# Patient Record
Sex: FEMALE | Race: WHITE | Employment: OTHER | URBAN - METROPOLITAN AREA
[De-identification: names, ages, dates, MRNs, and addresses within clinical notes are randomized per-mention and may not be internally consistent; named-entity substitution may affect disease eponyms.]

---

## 2017-05-01 ENCOUNTER — HOSPITAL ENCOUNTER (OUTPATIENT)
Dept: PHYSICAL THERAPY | Age: 82
Discharge: HOME | End: 2017-05-01
Payer: MEDICARE

## 2017-05-01 PROCEDURE — 97162 PT EVAL MOD COMPLEX 30 MIN: CPT

## 2017-05-01 PROCEDURE — G8979 MOBILITY GOAL STATUS: HCPCS

## 2017-05-01 PROCEDURE — 97530 THERAPEUTIC ACTIVITIES: CPT

## 2017-05-01 PROCEDURE — G8978 MOBILITY CURRENT STATUS: HCPCS

## 2017-05-01 NOTE — PROGRESS NOTES
PT DAILY TREATMENT NOTE - [facility]     Patient Name: Ludin Stanton  Date:2017  : 1928  [x]  Patient  Verified  Payor: Fawad Manriquez / Plan: [place] / Product Type: Medicare /    In time:12:25 pm  Out time:1:20 pm  Total Treatment Time (min): 55  Total Timed Codes (min): 20  1:1 Treatment Time ( only): 54   Visit #:  16    Treatment Area: Muscle weakness [M62.81]  Balance problem [R26.89]    SUBJECTIVE  Pain Level (0-10 scale): 0  Any medication changes, allergies to medications, adverse drug reactions, diagnosis change, or new procedure performed?: [x] No    [] Yes (see summary sheet for update)  Subjective functional status/changes:   [] No changes reported    Hx Present Illness: \"Well they think I had another stroke. Around the . I had the same symptoms as the last time. \"  Stated \"I noticed that this whole right side was week\"  Denise Badder 17 in bathroom- turned and hit walker and fell  Denise Badder 17 in bathroom at sink, turned and \"I just fell\"  Stated that is dizzy \"A little bit all the time, just sitting here then when I stand up I get real dizzy. \"  Increase in dizziness with going from sitting<>standing, bending over   Had episode of vertigo in mid April took Meclizine - rolling over in bed to the right.    Dizziness with ascending<>descending  Stirs, standing  Walks with 4 wheeled RW  Reports numbness and tingling into bilateral LE  Difficulty with going from sitting<>standing, walking, standing, toileting, shopping, bending over   Increased fatigue with daily activities - riding in car, shopping  Has pending CT scan to rule out/in CVA  Pt's daughter reports increased weakness and imbalance  Noted right hand and UE weakness       Pain:  _5-5__/10 max       __0_/10 min     _0___/10 now    Location: bilateral knees in anterior knees     [] Sharp    [] Dull      [] Burning     [x]  Aching     [] Throbbing      [] Tingling     [] Other:       []  Constant [x] Intermittent        Previous treatment:   PT following initial CVA    PMHX: Significant for: Previous CVA, please  See past medical hx form     Social/Recreation/Work: pt is retired  Lives with daughters - splits time between 2 different daughters homes  Stairs - railing on both sides  Mod I for ADLs - bathing    Patient Goal(s): \"To get my leg un-numbed and get some feeling back in my leg. \"    OBJECTIVE    Modality rationale:    Min Type Additional Details    [] Estim:  []Unatt       []IFC  []Premod                        []Other:  []w/ice   []w/heat  Position:  Location:    [] Estim: []Att    []TENS instruct  []NMES                    []Other:  []w/US   []w/ice   []w/heat  Position:  Location:    []  Traction: [] Cervical       []Lumbar                       [] Prone          []Supine                       []Intermittent   []Continuous Lbs:  [] before manual  [] after manual    []  Ultrasound: []Continuous   [] Pulsed                           []1MHz   []3MHz W/cm2:  Location:    []  Iontophoresis with dexamethasone         Location: [] Take home patch   [] In clinic    []  Ice     []  heat  []  Ice massage  []  Laser   []  Anodyne Position:  Location:    []  Laser with stim  []  Other:  Position:  Location:    []  Vasopneumatic Device Pressure:       [] lo [] med [] hi   Temperature: [] lo [] med [] hi   [] Skin assessment post-treatment:  []intact []redness- no adverse reaction    []redness  adverse reaction:     35 min [x]Eval                  []Re-Eval     20 min Therapeutic Activity:  []  See flow sheet : assessed with FOTO     With   [] TE   [x] TA   [] neuro   [] other: Patient Education: [] Review HEP    [] Progressed/Changed HEP based on:   [] positioning   [] body mechanics   [] transfers   [] heat/ice application    [x] other: reviewed exam finding, vestibular sx function, transfers, safety with transfers, anatomy involved with exam findings and POC     Other Objective/Functional Measures: Movement/gait:  Walks with 4 wheeled RW    Visual Inspection: Thoracic: increased  Lumbar: flattened    Palpation:                            AROM/PROM Right Left                                   Strength Right Left   Hip Flex 3 4-   Knee Ext 4- 5   Hamstrings 4 4   DF 3 4   PF 3- (gross) 3   IV 3 4   EV 3 4            Special Tests Right Left   Coordination  - heel to [facility]   Proprioception  Great toe  Ankle   Absent  WFL    WFL   VOR slow WFL WFL   VOR Fast + +   Head THrust +, nystagmus + - no nystagmus   Occular ROM  Corrective saccades Corrective saccades    Significant increase in sx with VOR assessment     Other EO EC   Wide POLLY NT NT   Regular POLLY NT NT   Romberg  NT NT                  Unable to assess balance due to increased dizziness       Other Comments: Standing Forward Flexion unable to, increases dizziness  With gross assessment decreased LT sensation on right LE compared to left          Pain Level (0-10 scale) post treatment: 0    ASSESSMENT/Changes in Function:   Pt is an 80 y.o. Female with C/C of dizziness, imbalance and right sided weakness. Pt reports possible CVA affecting right side, could have occurred at or around April 1 this year. Has pending CT scan tomorrow. Pt has hx of CVA with right sided hemiparesis. Pt reports 2 falls in last month both occurring in restroom after standing up from toileting and turning. Pt reports almost constant level of dizziness at times increases to debilitating level and has contributed to both falls. Increase in dizziness with going from sitting<>standing, bending over and intermittently rolling to right in bed. Other objective Findings include, corrective saccades with occular ROM and smooth pursuit, positive VOR bilaterally and positive head thrust to the right, right LE weakness, decreased anterior weight shift with sit<>stand. Pt lives with daughters - splits time between 2 daughters homes. Has stairs to negotiation and requires assistance. Could benefit from skilled PT to address dizziness, fall risk, functional mobility and safety with ADLs. Patient will continue to benefit from skilled PT services to modify and progress therapeutic interventions, address functional mobility deficits, address ROM deficits, address strength deficits, analyze and address soft tissue restrictions, analyze and cue movement patterns, analyze and modify body mechanics/ergonomics, assess and modify postural abnormalities, address imbalance/dizziness and instruct in home and community integration to attain remaining goals. [x]  See Plan of Care  []  See progress note/recertification  []  See Discharge Summary         Progress towards goals / Updated goals:  Short Term Goals: STG- To be accomplished in 3 week(s):  1. Pt will be independent with HEP to encourage prophylaxis. Eval: held due to time and dizziness  Current: NA    2. Pt will demonstrate safe sit<>stand transfers, Mod I with adherence to all safety precautions. Eval: dizziness, use of hand on RW and increased dizziness. Current: NA    Long Term Goals: LTG- To be accomplished in 8 week(s):  1. Pt will improve Romberg to 1-2 errors with EO and EC to increase tolerance to daily activities. Eval:unable to balance with regular POLLY  Current: NA    2. Improve DIAMOND by >8 points to indicate decreased fall risk. Eval: evidenced of elevated fall risk  Current: NA    3. Improve right LE MMT to 3/5 MMT or greater to increase pt tolerance to daily activities and WB activities. Eval:  Strength Right Left   Hip Flex 3 4-   Knee Ext 4- 5   Hamstrings 4 4   DF 3 4   PF 3- (gross) 3   IV 3 4   EV 3 4              Current: NA    4. Pt FOTO score will increase by 10 points to show improvement in function. Eval:30  Current: will address at visit 5    5. Pt will be able to go from sitting<>standing without debilitating dizziness.   Eval: constant dizziness  Current: NA      PLAN  [x]  Upgrade activities as tolerated []  Continue plan of care  []  Update interventions per flow sheet       []  Discharge due to:_  []  Other:_      Birtha Pasha PT, DPT 5/1/2017  12:27 PM    No future appointments.

## 2017-05-01 NOTE — PROGRESS NOTES
[facility] at the [address] [place], [address] [facility]  Phone: [phone]      Fax:  [phone]    Plan of Care/ Statement of Necessity for Physical Therapy Services    Patient name: Nathaly Starks Start of Care: 2017   Referral source: Miguel Angel Rodríguez MD : 1928    Medical Diagnosis: Muscle weakness [M62.81]  Balance problem [R26.89]   Onset Date:~17    Treatment Diagnosis: Right sided weakness, dizziness, imbalance    Prior Hospitalization: see medical history Provider#: [id number]   Medications: Verified on Patient summary List    Comorbidities: Allergies, BMI >30, HTN, Previous CVA   Prior Level of Function: constant dizziness, hx of 2 falls in last month, limited community Ambulator with rolling walker, CGA-SBA with stairs and 2 railings, CGA - Mod I with ADLs. The Plan of Care and following information is based on the information from the initial evaluation. Assessment/ key information:   Pt is an 80 y.o. Female with C/C of dizziness, imbalance and right sided weakness. Pt reports possible CVA affecting right side, could have occurred at or around  this year. Has pending CT scan tomorrow. Pt has hx of CVA with right sided hemiparesis. Pt reports 2 falls in last month both occurring in restroom after standing up from toileting and turning. Pt reports almost constant level of dizziness at times increases to debilitating level and has contributed to both falls. Increase in dizziness with going from sitting<>standing, bending over and intermittently rolling to right in bed. Other objective Findings include, corrective saccades with occular ROM and smooth pursuit, positive VOR bilaterally and positive head thrust to the right, right LE weakness, decreased anterior weight shift with sit<>stand. Pt lives with daughters - splits time between 2 daughters homes. Has stairs to negotiation and requires assistance.  Could benefit from skilled PT to address dizziness, fall risk, functional mobility and safety with ADLs. Evaluation Complexity History HIGH Complexity :3+ comorbidities / personal factors will impact the outcome/ POC ; Examination HIGH Complexity : 4+ Standardized tests and measures addressing body structure, function, activity limitation and / or participation in recreation  ;Presentation HIGH Complexity : Unstable and unpredictable characteristics  ; Clinical Decision Making MEDIUM Complexity : FOTO score of 26-74  Overall Complexity Rating: MEDIUM    Problem List: pain affecting function, decrease ROM, decrease strength, edema affecting function, impaired gait/ balance, decrease ADL/ functional abilitiies, decrease activity tolerance, decrease flexibility/ joint mobility and decrease transfer abilities   Treatment Plan may include any combination of the following: Therapeutic exercise, Therapeutic activities, Neuromuscular re-education, Physical agent/modality, Gait/balance training, Manual therapy, Aquatic therapy, Patient education and Home safety training  Patient / Family readiness to learn indicated by: asking questions, trying to perform skills and interest  Persons(s) to be included in education: patient (P) and family support person (FSP);list pt's daughter   Barriers to Learning/Limitations: None  Patient Goal (s): To get my leg un-numbed and get some feeling back in my leg  Patient Self Reported Health Status: fair  Rehabilitation Potential: good    Short Term Goals: STG- To be accomplished in 3 week(s):  1. Pt will be independent with HEP to encourage prophylaxis. Eval: held due to time and dizziness  Current: NA     2. Pt will demonstrate safe sit<>stand transfers, Mod I with adherence to all safety precautions. Eval: dizziness, use of hand on RW and increased dizziness. Current: NA     Long Term Goals: LTG- To be accomplished in 8 week(s):  1.  Pt will improve Romberg to 1-2 errors with EO and EC to increase tolerance to daily activities. Eval:unable to balance with regular POLLY  Current: NA     2. Improve DIAMOND by >8 points to indicate decreased fall risk. Eval: evidenced of elevated fall risk  Current: NA     3. Improve right LE MMT to 3/5 MMT or greater to increase pt tolerance to daily activities and WB activities. Eval:  Strength Right Left   Hip Flex 3 4-   Knee Ext 4- 5   Hamstrings 4 4   DF 3 4   PF 3- (gross) 3   IV 3 4   EV 3 4                 Current: NA     4. Pt FOTO score will increase by 10 points to show improvement in function. Eval:30  Current: will address at visit 5     5. Pt will be able to go from sitting<>standing without debilitating dizziness. Eval: constant dizziness  Current: NA  Frequency / Duration: Patient to be seen 2 times per week for 8 weeks. Patient/ Caregiver education and instruction: Diagnosis, prognosis, self care, activity modification and other safety with sit<>stand transfers   [x]  Plan of care has been reviewed with PTA    G-Codes (GP)  Mobility   Current  CL= 60-79%   Goal  CK= 40-59%      The severity rating is based on clinical judgment and the FOTO score. Certification Period: 5/1/17 - 7/1/17   Bianka Rodriguez PT, DPT 5/1/2017 2:35 PM  _____________________________________________________________________  I certify that the above Therapy Services are being furnished while the patient is under my care. I agree with the treatment plan and certify that this therapy is necessary.     Physician's Signature:____________________  Date:__________Time:______    Please sign and return to   [facility] at the [address], [facility], [address] [facility]  Phone: [phone]      Fax:  [phone]

## 2017-05-03 ENCOUNTER — HOSPITAL ENCOUNTER (OUTPATIENT)
Dept: PHYSICAL THERAPY | Age: 82
Discharge: HOME | End: 2017-05-03
Payer: MEDICARE

## 2017-05-03 PROCEDURE — 97140 MANUAL THERAPY 1/> REGIONS: CPT

## 2017-05-03 PROCEDURE — 97530 THERAPEUTIC ACTIVITIES: CPT

## 2017-05-03 NOTE — PROGRESS NOTES
PT DAILY TREATMENT NOTE - [facility]     Patient Name: Esther Garcia  Date:5/3/2017  : 1928  [x]  Patient  Verified  Payor: Adi Masoud / Plan: [place] MEDICARE PART A & B / Product Type: Medicare /    In time:12:00 pm  Out time:12:50 pm  Total Treatment Time (min): 50  Total Timed Codes (min): 50  1:1 Treatment Time ( [address] only): 50   Visit #: 2 of 16    Treatment Area: Muscle weakness [M62.81]  Balance problem [R26.89]    SUBJECTIVE  Pain Level (0-10 scale): 0  Any medication changes, allergies to medications, adverse drug reactions, diagnosis change, or new procedure performed?: [x] No    [] Yes (see summary sheet for update)  Subjective functional status/changes:   [] No changes reported  \"I am dizzy, I am dizzy all the time, I have been dizzy since I left last time. \"    OBJECTIVE    Modality rationale:    Min Type Additional Details    [] Estim:  []Unatt       []IFC  []Premod                        []Other:  []w/ice   []w/heat  Position:  Location:    [] Estim: []Att    []TENS instruct  []NMES                    []Other:  []w/US   []w/ice   []w/heat  Position:  Location:    []  Traction: [] Cervical       []Lumbar                       [] Prone          []Supine                       []Intermittent   []Continuous Lbs:  [] before manual  [] after manual    []  Ultrasound: []Continuous   [] Pulsed                           []1MHz   []3MHz W/cm2:  Location:    []  Iontophoresis with dexamethasone         Location: [] Take home patch   [] In clinic    []  Ice     []  heat  []  Ice massage  []  Laser   []  Anodyne Position:  Location:    []  Laser with stim  []  Other:  Position:  Location:    []  Vasopneumatic Device Pressure:       [] lo [] med [] hi   Temperature: [] lo [] med [] hi   [] Skin assessment post-treatment:  []intact []redness- no adverse reaction    []redness  adverse reaction:     40 min Therapeutic Activity:  []  See flow sheet :assessment for BPPV, pt education, assistance leaving clinic and getting gin car   Rationale: improve coordination, improve balance and increase proprioception  to improve the patients ability to perform daily activities with decreased pain and symptom levels     10 min Manual Therapy:  Correction for BPPV with CRM   Rationale: correct positional vertigo to improve safety and quality of life          With   [] TE   [x] TA   [] neuro   [] other: Patient Education: [] Review HEP    [] Progressed/Changed HEP based on:   [] positioning   [] body mechanics   [] transfers   [] heat/ice application    [x] other: reviewed precautions - no lying down until bedtime. Other Objective/Functional Measures:   + Dellis Cower to the Right     Pain Level (0-10 scale) post treatment: 0    ASSESSMENT/Changes in Function:   Only able to test and correct 1 time for positional vertigo due to increased fatigue. Pt was very fatigued after treatment session. Reported that dizziness was \"slightly better\" at end of session. Required CGA-SBA with exiting clinic. Suspect underlying vestibular dysfunction. Patient will continue to benefit from skilled PT services to modify and progress therapeutic interventions, address functional mobility deficits, address ROM deficits, address strength deficits, analyze and address soft tissue restrictions, analyze and cue movement patterns, analyze and modify body mechanics/ergonomics, assess and modify postural abnormalities, address imbalance/dizziness and instruct in home and community integration to attain remaining goals. []  See Plan of Care  []  See progress note/recertification  []  See Discharge Summary         Progress towards goals / Updated goals:  Short Term Goals: STG- To be accomplished in 3 week(s):  1. Pt will be independent with HEP to encourage prophylaxis. Eval: held due to time and dizziness  Current: NA due to assessing and treating BPPV      2. Pt will demonstrate safe sit<>stand transfers, Mod I with adherence to all safety precautions. Eval: dizziness, use of hand on RW and increased dizziness. Current: NA      Long Term Goals: LTG- To be accomplished in 8 week(s):  1. Pt will improve Romberg to 1-2 errors with EO and EC to increase tolerance to daily activities. Eval:unable to balance with regular POLLY  Current: NA      2. Improve DIAMOND by >8 points to indicate decreased fall risk. Eval: evidenced of elevated fall risk  Current: NA      3. Improve right LE MMT to 3/5 MMT or greater to increase pt tolerance to daily activities and WB activities. Eval:  Strength Right Left   Hip Flex 3 4-   Knee Ext 4- 5   Hamstrings 4 4   DF 3 4   PF 3- (gross) 3   IV 3 4   EV 3 4                  Current: NA      4. Pt FOTO score will increase by 10 points to show improvement in function. Eval:30  Current: will address at visit 5      5. Pt will be able to go from sitting<>standing without debilitating dizziness.   Eval: constant dizziness  Current: NA      PLAN  []  Upgrade activities as tolerated     []  Continue plan of care  []  Update interventions per flow sheet       []  Discharge due to:_  []  Other:_      Annia Steele, PT, DPT 5/3/2017  1:19 PM    Future Appointments  Date Time Provider [place]   5/8/2017 9:30 AM Robles Ear [facility]   5/10/2017 9:30 AM Annia Steele PT, DPT [facility]   5/15/2017 9:30 AM Rob[facility] Ear [facility]   5/17/2017 9:30 AM Annia Steele PT, DPT [facility]   5/22/2017 11:00 AM Robles Ear [facility]   5/24/2017 11:00 AM Robles Ear [facility]

## 2017-05-08 ENCOUNTER — HOSPITAL ENCOUNTER (OUTPATIENT)
Dept: PHYSICAL THERAPY | Age: 82
Discharge: HOME | End: 2017-05-08
Payer: MEDICARE

## 2017-05-08 PROCEDURE — 97140 MANUAL THERAPY 1/> REGIONS: CPT

## 2017-05-08 PROCEDURE — 97530 THERAPEUTIC ACTIVITIES: CPT

## 2017-05-08 NOTE — PROGRESS NOTES
PT DAILY TREATMENT NOTE     Patient Name: Tatyana Gautam  Date:2017  : 1928  [x]  Patient  Verified  Payor: Anusha Rg / Plan: [place] MED[place] A & B / Product Type: Medicare /    In time:9:36 am  Out time: 10:40 am  Total Treatment Time (min): 64  Total Timed Codes (min): 64  1:1 Treatment Time ( [address] only): 50   Visit #: 3 of 16    Treatment Area: Muscle weakness [M62.81]  Balance problem [R26.89]    SUBJECTIVE  Pain Level (0-10 scale): 0  Any medication changes, allergies to medications, adverse drug reactions, diagnosis change, or new procedure performed?: [x] No    [] Yes (see summary sheet for update)  Subjective functional status/changes:   [] No changes reported  \"A smidge better. Just dizzy all the time. When i sit up, stand up roll over. \"    OBJECTIVE    Modality rationale:    Min Type Additional Details    [] Estim:  []Unatt       []IFC  []Premod                        []Other:  []w/ice   []w/heat  Position:  Location:    [] Estim: []Att    []TENS instruct  []NMES                    []Other:  []w/US   []w/ice   []w/heat  Position:  Location:    []  Traction: [] Cervical       []Lumbar                       [] Prone          []Supine                       []Intermittent   []Continuous Lbs:  [] before manual  [] after manual    []  Ultrasound: []Continuous   [] Pulsed                           []1MHz   []3MHz W/cm2:  Location:    []  Iontophoresis with dexamethasone         Location: [] Take home patch   [] In clinic    []  Ice     []  heat  []  Ice massage  []  Laser   []  Anodyne Position:  Location:    []  Laser with stim  []  Other:  Position:  Location:    []  Vasopneumatic Device Pressure:       [] lo [] med [] hi   Temperature: [] lo [] med [] hi   [] Skin assessment post-treatment:  []intact []redness- no adverse reaction    []redness  adverse reaction:     40 min Therapeutic Activity: [] See flow sheet :assessment for BPPV, pt education, assistance leaving clinic and getting gin car Rationale: improve coordination, improve balance and increase proprioception to improve the patients ability to perform daily activities with decreased pain and symptom levels    24 min Manual Therapy: Correction for BPPV with CRM x2   Rationale: correct positional vertigo to improve safety and quality of life          With   [] TE   [x] TA   [] neuro   [] other: Patient Education: [x] Review HEP - added VOR x1 with pt and pt's daughter     [] Progressed/Changed HEP based on:   [] positioning   [] body mechanics   [] transfers   [] heat/ice application    [] other:      Other Objective/Functional Measures:   + [place] to the right  Sx dissipated 3 min quicker with second assessment and CRM     Pain Level (0-10 scale) post treatment: 0    ASSESSMENT/Changes in Function:   Pt was very fatigued after treatment session. Reported that dizziness was \"slightly better\" at end of session. Used WC to transport pt to car, CGA-SBA for car transfer. Suspect underlying vestibular dysfunction. Patient will continue to benefit from skilled PT services to modify and progress therapeutic interventions, address functional mobility deficits, address ROM deficits, address strength deficits, analyze and address soft tissue restrictions, analyze and cue movement patterns, analyze and modify body mechanics/ergonomics, assess and modify postural abnormalities, address imbalance/dizziness and instruct in home and community integration to attain remaining goals. []  See Plan of Care  []  See progress note/recertification  []  See Discharge Summary         Progress towards goals / Updated goals:  Short Term Goals: STG- To be accomplished in 3 week(s):  1. Pt will be independent with HEP to encourage prophylaxis. Eval: held due to time and dizziness  Current: NA due to assessing and treating BPPV      2. Pt will demonstrate safe sit<>stand transfers, Mod I with adherence to all safety precautions.    Eval: dizziness, use of hand on RW and increased dizziness. Current: NA      Long Term Goals: LTG- To be accomplished in 8 week(s):  1. Pt will improve Romberg to 1-2 errors with EO and EC to increase tolerance to daily activities. Eval:unable to balance with regular POLLY  Current: NA      2. Improve DIAMOND by >8 points to indicate decreased fall risk. Eval: evidenced of elevated fall risk  Current: NA      3. Improve right LE MMT to 3/5 MMT or greater to increase pt tolerance to daily activities and WB activities. Eval:  Strength Right Left   Hip Flex 3 4-   Knee Ext 4- 5   Hamstrings 4 4   DF 3 4   PF 3- (gross) 3   IV 3 4   EV 3 4                  Current: NA      4. Pt FOTO score will increase by 10 points to show improvement in function. Eval:30  Current: will address at visit 5      5. Pt will be able to go from sitting<>standing without debilitating dizziness.   Eval: constant dizziness  Current: NA         PLAN  []  Upgrade activities as tolerated     []  Continue plan of care  []  Update interventions per flow sheet       []  Discharge due to:_  []  Other:_      Juma Aguirre PT, DPT 5/8/2017  10:02 AM    Future Appointments  Date Time Provider Allie Schmitt   5/10/2017 9:30 AM Juma Aguirre PT, DPT [facility]   5/15/2017 9:30 AM Jayda [facility]   5/17/2017 9:30 AM Juma Aguirre PT, DPT [facility]   5/22/2017 11:00 AM Jayda [facility]   5/24/2017 11:00 AM Jayda [facility]

## 2017-05-10 ENCOUNTER — HOSPITAL ENCOUNTER (OUTPATIENT)
Dept: PHYSICAL THERAPY | Age: 82
Discharge: HOME | End: 2017-05-10
Payer: MEDICARE

## 2017-05-10 PROCEDURE — 97530 THERAPEUTIC ACTIVITIES: CPT

## 2017-05-10 PROCEDURE — 97112 NEUROMUSCULAR REEDUCATION: CPT

## 2017-05-10 NOTE — PROGRESS NOTES
PT DAILY TREATMENT NOTE - [facility]     Patient Name: Maris Cortes  Date:5/10/2017  : 1928  [x]  Patient  Verified  Payor: Damian Ap / Plan: [place] MEDICARE PART A & B / Product Type: Medicare /    In time:9:37 am  Out time:10:20 am  Total Treatment Time (min): 43  Total Timed Codes (min): 43  1:1 Treatment Time ( [address] only): 37   Visit #: 4 of 16    Treatment Area: Muscle weakness [M62.81]  Balance problem [R26.89]    SUBJECTIVE  Pain Level (0-10 scale): 0  Any medication changes, allergies to medications, adverse drug reactions, diagnosis change, or new procedure performed?: [x] No    [] Yes (see summary sheet for update)  Subjective functional status/changes:   [] No changes reported  \"It is gone. I am so much better. Monday was terrible but Tuesday it was almost all the way gone. I am just a little light dizzy with some things. \"    OBJECTIVE    Modality rationale:    Min Type Additional Details    [] Estim:  []Unatt       []IFC  []Premod                        []Other:  []w/ice   []w/heat  Position:  Location:    [] Estim: []Att    []TENS instruct  []NMES                    []Other:  []w/US   []w/ice   []w/heat  Position:  Location:    []  Traction: [] Cervical       []Lumbar                       [] Prone          []Supine                       []Intermittent   []Continuous Lbs:  [] before manual  [] after manual    []  Ultrasound: []Continuous   [] Pulsed                           []1MHz   []3MHz W/cm2:  Location:    []  Iontophoresis with dexamethasone         Location: [] Take home patch   [] In clinic    []  Ice     []  heat  []  Ice massage  []  Laser   []  Anodyne Position:  Location:    []  Laser with stim  []  Other:  Position:  Location:    []  Vasopneumatic Device Pressure:       [] lo [] med [] hi   Temperature: [] lo [] med [] hi   [] Skin assessment post-treatment:  []intact []redness- no adverse reaction    []redness  adverse reaction:       43 min Therapeutic Activity:  []  See flow sheet : Rationale: improve coordination, improve balance and increase proprioception  to improve the patients ability to perform daily activities with decreased pain and symptom levels          With   [] TE   [x] TA   [] neuro   [] other: Patient Education: [x] Review HEP    [] Progressed/Changed HEP based on:   [] positioning   [] body mechanics   [] transfers   [] heat/ice application    [] other:      Other Objective/Functional Measures:   DIAMOND 20/56     Pain Level (0-10 scale) post treatment: 0    ASSESSMENT/Changes in Function:   Pt able to tolerate assessment with DIAMOND this session. Reports remarked decrease in dizziness since Monday. Only has dizziness with turning and intermittently with bending over. Able to turn to the left, unable to to turn to right with DIAMOND due to Right LE and dizziness. Reports that dizziness went from constant or 10/10 to 2/10 and only intermittent. Patient will continue to benefit from skilled PT services to modify and progress therapeutic interventions, address functional mobility deficits, address ROM deficits, address strength deficits, analyze and address soft tissue restrictions, analyze and cue movement patterns, analyze and modify body mechanics/ergonomics, assess and modify postural abnormalities, address imbalance/dizziness and instruct in home and community integration to attain remaining goals. []  See Plan of Care  []  See progress note/recertification  []  See Discharge Summary         Progress towards goals / Updated goals:  Short Term Goals: STG- To be accomplished in 3 week(s):  1. Pt will be independent with HEP to encourage prophylaxis. Eval: held due to time and dizziness  Current: dispensed VOR, will update as pt progresses      2. Pt will demonstrate safe sit<>stand transfers, Mod I with adherence to all safety precautions. Eval: dizziness, use of hand on RW and increased dizziness.    Current: requires use of hands from table or chair.      Long Term Goals: LTG- To be accomplished in 8 week(s):  1. Pt will improve Romberg to 1-2 errors with EO and EC to increase tolerance to daily activities. Eval:unable to balance with regular POLLY  Current: NA      2. Improve DIAMOND by >8 points to indicate decreased fall risk. Eval: 20/56 - increased fall risk  Current: reassess in 5-6 visits ss      3. Improve right LE MMT to 3/5 MMT or greater to increase pt tolerance to daily activities and WB activities. Eval:  Strength Right Left   Hip Flex 3 4-   Knee Ext 4- 5   Hamstrings 4 4   DF 3 4   PF 3- (gross) 3   IV 3 4   EV 3 4                  Current: NA      4. Pt FOTO score will increase by 10 points to show improvement in function. Eval:30  Current: will address at visit 5      5. Pt will be able to go from sitting<>standing without debilitating dizziness.   Eval: constant dizziness  Current: NA      PLAN  [x]  Upgrade activities as tolerated     []  Continue plan of care  []  Update interventions per flow sheet       []  Discharge due to:_  []  Other:_      Suzie Berumen PT, DPT 5/10/2017  9:55 AM    Future Appointments  Date Time Provider Allie Schmitt   5/15/2017 9:30 AM Mickey [facility]   5/17/2017 9:30 AM Suzie Berumen PT, DPT [facility]   5/22/2017 11:00 AM Mickey [facility]   5/24/2017 11:00 AM STELLA Lopez [facility]

## 2017-05-15 ENCOUNTER — HOSPITAL ENCOUNTER (OUTPATIENT)
Dept: PHYSICAL THERAPY | Age: 82
Discharge: HOME | End: 2017-05-15
Payer: MEDICARE

## 2017-05-15 PROCEDURE — 97530 THERAPEUTIC ACTIVITIES: CPT

## 2017-05-15 PROCEDURE — 97112 NEUROMUSCULAR REEDUCATION: CPT

## 2017-05-15 PROCEDURE — 97110 THERAPEUTIC EXERCISES: CPT

## 2017-05-15 PROCEDURE — 97140 MANUAL THERAPY 1/> REGIONS: CPT

## 2017-05-15 NOTE — PROGRESS NOTES
PT DAILY TREATMENT NOTE - [facility] 3-16    Patient Name: Filomena Blackwell  Date:5/15/2017  : 1928  [x]  Patient  Verified  Payor: [place] MEDICARE / Plan: [place] MEDICARE PART A & B / Product Type: Medicare /    In time:9:35  Out time:10:22  Total Treatment Time (min): 47  Total Timed Codes (min): 47  1:1 Treatment Time [facility] only):47  Visit #: 5 of 16    Treatment Area: Muscle weakness [M62.81]  Balance problem [R26.89]    SUBJECTIVE  Pain Level (0-10 scale): 0/10  Any medication changes, allergies to medications, adverse drug reactions, diagnosis change, or new procedure performed?: [x] No    [] Yes (see summary sheet for update)  Subjective functional status/changes:   [] No changes reported  \"I don't have any dizziness right now but I had some over the weekend. \"    OBJECTIVE      27 min Therapeutic Exercise:  [x] See flow sheet :   Rationale: increase ROM, increase strength and improve coordination to improve the patients ability to amb safely throughout daily activities. 20 min Neuromuscular Re-education:  [x]  See flow sheet :   Rationale: increase ROM, increase strength and improve coordination  to improve the patients ability to amb safely throughout daily activities. With   [] TE   [] TA   [] neuro   [] other: Patient Education: [x] Review HEP    [] Progressed/Changed HEP based on:   [] positioning   [] body mechanics   [] transfers   [] heat/ice application    [] other:      Other Objective/Functional Measures:      Pain Level (0-10 scale) post treatment: 0/10    ASSESSMENT/Changes in Function: Updated ex program to begin balance training and strength training for LE weakness. Pt tolerated ex well but required CGA for balance correction. Pt denied modalities post tx.      Patient will continue to benefit from skilled PT services to modify and progress therapeutic interventions, address functional mobility deficits, address ROM deficits, address strength deficits, analyze and address soft tissue restrictions and analyze and modify body mechanics/ergonomics to attain remaining goals. []  See Plan of Care  []  See progress note/recertification  []  See Discharge Summary         Progress towards goals / Updated goals:  Short Term Goals: STG- To be accomplished in 3 week(s):  1. Pt will be independent with HEP to encourage prophylaxis. Eval: held due to time and dizziness  Current: dispensed VOR, will update as pt progresses      2. Pt will demonstrate safe sit<>stand transfers, Mod I with adherence to all safety precautions. Eval: dizziness, use of hand on RW and increased dizziness. Current: requires use of hands from table or chair.      Long Term Goals: LTG- To be accomplished in 8 week(s):  1. Pt will improve Romberg to 1-2 errors with EO and EC to increase tolerance to daily activities. Eval:unable to balance with regular POLLY  Current: NA      2. Improve DIAMOND by >8 points to indicate decreased fall risk. Eval: 20/56 - increased fall risk  Current: reassess in 5-6 visits ss      3. Improve right LE MMT to 3/5 MMT or greater to increase pt tolerance to daily activities and WB activities. Eval:  Strength Right Left   Hip Flex 3 4-   Knee Ext 4- 5   Hamstrings 4 4   DF 3 4   PF 3- (gross) 3   IV 3 4   EV 3 4                  Current: NA      4. Pt FOTO score will increase by 10 points to show improvement in function. Eval:30  Current: will address at visit 5      5. Pt will be able to go from sitting<>standing without debilitating dizziness.   Eval: constant dizziness  Current: NA         PLAN  []  Upgrade activities as tolerated     []  Continue plan of care  []  Update interventions per flow sheet       []  Discharge due to:_  []  Other:_      Jayda Liu 5/15/2017  12:49 PM

## 2017-05-17 ENCOUNTER — HOSPITAL ENCOUNTER (OUTPATIENT)
Dept: PHYSICAL THERAPY | Age: 82
Discharge: HOME | End: 2017-05-17
Payer: MEDICARE

## 2017-05-17 PROCEDURE — 97112 NEUROMUSCULAR REEDUCATION: CPT

## 2017-05-17 PROCEDURE — 97530 THERAPEUTIC ACTIVITIES: CPT

## 2017-05-17 NOTE — PROGRESS NOTES
PT DAILY TREATMENT NOTE - [facility]     Patient Name: Irina Rm  Date:2017  : 1928  [x]  Patient  Verified  Payor: Roscoe Ee / Plan: [place] MEDICARE PART A & B / Product Type: Medicare /    In time:9:35 am  Out time:10:20  Total Treatment Time (min): 45  Total Timed Codes (min): 45  1:1 Treatment Time ([address] only): 39   Visit #: 6 of 16    Treatment Area: Muscle weakness [M62.81]  Balance problem [R26.89]    SUBJECTIVE  Pain Level (0-10 scale): 0  Any medication changes, allergies to medications, adverse drug reactions, diagnosis change, or new procedure performed?: [x] No    [] Yes (see summary sheet for update)  Subjective functional status/changes:   [] No changes reported  \"I am still so much better. \"    OBJECTIVE    Modality rationale:    Min Type Additional Details    [] Estim:  []Unatt       []IFC  []Premod                        []Other:  []w/ice   []w/heat  Position:  Location:    [] Estim: []Att    []TENS instruct  []NMES                    []Other:  []w/US   []w/ice   []w/heat  Position:  Location:    []  Traction: [] Cervical       []Lumbar                       [] Prone          []Supine                       []Intermittent   []Continuous Lbs:  [] before manual  [] after manual    []  Ultrasound: []Continuous   [] Pulsed                           []1MHz   []3MHz W/cm2:  Location:    []  Iontophoresis with dexamethasone         Location: [] Take home patch   [] In clinic    []  Ice     []  heat  []  Ice massage  []  Laser   []  Anodyne Position:  Location:    []  Laser with stim  []  Other:  Position:  Location:    []  Vasopneumatic Device Pressure:       [] lo [] med [] hi   Temperature: [] lo [] med [] hi   [] Skin assessment post-treatment:  []intact []redness- no adverse reaction    []redness  adverse reaction:       35 min Therapeutic Activity:  [x]  See flow sheet :   Rationale: increase ROM, increase strength, improve coordination, improve balance and increase proprioception  to improve the patients ability to perform daily activities with decreased pain and symptom levels and decrease fall risk     10 min Neuromuscular Re-education:  [x]  See flow sheet :   Rationale: increase ROM, increase strength, improve coordination, improve balance and increase proprioception  to improve the patients ability to perform daily activities with decreased pain and symptom levels and decrease fall rsik          With   [] TE   [] TA   [] neuro   [] other: Patient Education: [x] Review HEP    [] Progressed/Changed HEP based on:   [] positioning   [] body mechanics   [] transfers   [] heat/ice application    [] other:      Other Objective/Functional Measures:   1-4 errors with balance activities, most challenged with EC    Pain Level (0-10 scale) post treatment: 0    ASSESSMENT/Changes in Function:   Pt reports no dizziness except for with bending over fora prolonged period and returning to upright. Challenged with balance activities and requires SBA or CGA. Hamstring cramping with bridges,decreased cramping with cues to perform glut set. Patient will continue to benefit from skilled PT services to modify and progress therapeutic interventions, address functional mobility deficits, address ROM deficits, address strength deficits, analyze and address soft tissue restrictions, analyze and cue movement patterns, analyze and modify body mechanics/ergonomics, assess and modify postural abnormalities, address imbalance/dizziness and instruct in home and community integration to attain remaining goals. []  See Plan of Care  []  See progress note/recertification  []  See Discharge Summary         Progress towards goals / Updated goals:  Short Term Goals: STG- To be accomplished in 3 week(s):  1. Pt will be independent with HEP to encourage prophylaxis. Eval: held due to time and dizziness  Current: dispensed VOR, will update as pt progresses      2.  Pt will demonstrate safe sit<>stand transfers, Mod I with adherence to all safety precautions. Eval: dizziness, use of hand on RW and increased dizziness. Current: sit<>stand form 19 in surface with no use of UE      Long Term Goals: LTG- To be accomplished in 8 week(s):  1. Pt will improve Romberg to 1-2 errors with EO and EC to increase tolerance to daily activities. Eval:unable to balance with regular POLLY  Current: NA      2. Improve DIAMOND by >8 points to indicate decreased fall risk. Eval: 20/56 - increased fall risk  Current: reassess at visit 10 or 11      3. Improve right LE MMT to 3/5 MMT or greater to increase pt tolerance to daily activities and WB activities. Eval:  Strength Right Left   Hip Flex 3 4-   Knee Ext 4- 5   Hamstrings 4 4   DF 3 4   PF 3- (gross) 3   IV 3 4   EV 3 4                  Current: appropriately challenged with bridges      4. Pt FOTO score will increase by 10 points to show improvement in function. Eval:30  Current: having  connectivity issues, 5/17/17 will reassess next visit      5. Pt will be able to go from sitting<>standing without debilitating dizziness.   Eval: constant dizziness  Current: NA          PLAN  [x]  Upgrade activities as tolerated     [x]  Continue plan of care  []  Update interventions per flow sheet       []  Discharge due to:_  []  Other:_      Michael Naranjo PT, DPT 5/17/2017  10:32 AM    Future Appointments  Date Time Provider Allie Schmitt   5/22/2017 11:00 AM Jackson [facility]   5/24/2017 11:00 AM STELLA BroussardMAGGIE [facility]

## 2017-05-22 ENCOUNTER — HOSPITAL ENCOUNTER (OUTPATIENT)
Dept: PHYSICAL THERAPY | Age: 82
Discharge: HOME | End: 2017-05-22
Payer: MEDICARE

## 2017-05-22 PROCEDURE — 97530 THERAPEUTIC ACTIVITIES: CPT

## 2017-05-22 PROCEDURE — 97112 NEUROMUSCULAR REEDUCATION: CPT

## 2017-05-22 PROCEDURE — 97110 THERAPEUTIC EXERCISES: CPT

## 2017-05-22 NOTE — PROGRESS NOTES
PT DAILY TREATMENT NOTE - [facility] 3-16    Patient Name: Maris Cortes  Date:2017  : 1928  [x]  Patient  Verified  Payor: VA MEDICARE / Plan: VA MEDICARE PART A & B / Product Type: Medicare /    In time:11:05  Out time:11:55  Total Treatment Time (min): 50  Total Timed Codes (min): 50  1:1 Treatment Time ( [address] only): 39   Visit #: 7 of 16    Treatment Area: Muscle weakness [M62.81]  Balance problem [R26.89]    SUBJECTIVE  Pain Level (0-10 scale): 0/10  Any medication changes, allergies to medications, adverse drug reactions, diagnosis change, or new procedure performed?: [x] No    [] Yes (see summary sheet for update)  Subjective functional status/changes:   [] No changes reported  \"I feel great. I have for the almost the past week. \"    OBJECTIVE      25 min Therapeutic Exercise:  [x] See flow sheet :   Rationale: increase ROM, increase strength and improve coordination to improve the patients ability to amb safely throughout daily activities. 15 min Therapeutic Activity:  [x]  See flow sheet :   Rationale: increase ROM, increase strength and improve coordination  to improve the patients ability to amb safely throughout daily activities. 10 min Neuromuscular Re-education:  [x]  See flow sheet :   Rationale: increase ROM, increase strength and improve coordination  to improve the patients ability to amb safely throughout daily activities. With   [] TE   [] TA   [] neuro   [] other: Patient Education: [x] Review HEP    [] Progressed/Changed HEP based on:   [] positioning   [] body mechanics   [] transfers   [] heat/ice application    [] other:      Other Objective/Functional Measures: FOTO:  57    Pain Level (0-10 scale) post treatment: 0/10    ASSESSMENT/Changes in Function: Pt able to progress slightly with balance ex. Pt continues to be mildly challenged by ex showing increased sway and requiring CGA with balance activities. Pt denied modalities post tx.      Patient will continue to benefit from skilled PT services to modify and progress therapeutic interventions, address functional mobility deficits, address ROM deficits, address strength deficits and analyze and modify body mechanics/ergonomics to attain remaining goals. []  See Plan of Care  []  See progress note/recertification  []  See Discharge Summary         Progress towards goals / Updated goals:  Short Term Goals: STG- To be accomplished in 3 week(s):  1. Pt will be independent with HEP to encourage prophylaxis. Eval: held due to time and dizziness  Current: dispensed VOR, will update as pt progresses      2. Pt will demonstrate safe sit<>stand transfers, Mod I with adherence to all safety precautions. Eval: dizziness, use of hand on RW and increased dizziness. Current: sit<>stand form 19 in surface with no use of UE      Long Term Goals: LTG- To be accomplished in 8 week(s):  1. Pt will improve Romberg to 1-2 errors with EO and EC to increase tolerance to daily activities. Eval:unable to balance with regular POLLY  Current: Romberg EO: 0 errors,      2. Improve DIAMOND by >8 points to indicate decreased fall risk. Eval: 20/56 - increased fall risk  Current: reassess at visit 10 or 11      3. Improve right LE MMT to 3/5 MMT or greater to increase pt tolerance to daily activities and WB activities. Eval:  Strength Right Left   Hip Flex 3 4-   Knee Ext 4- 5   Hamstrings 4 4   DF 3 4   PF 3- (gross) 3   IV 3 4   EV 3 4                  Current: appropriately challenged with bridges      4. Pt FOTO score will increase by 10 points to show improvement in function. Eval:30  Current: having  connectivity issues, 5/17/17 will reassess next visit      5. Pt will be able to go from sitting<>standing without debilitating dizziness.   Eval: constant dizziness  Current: NA      PLAN  []  Upgrade activities as tolerated     [x]  Continue plan of care  []  Update interventions per flow sheet       []  Discharge due to:_  []  Other:_ Ana Samuels 5/22/2017  2:23 PM

## 2017-05-24 ENCOUNTER — APPOINTMENT (OUTPATIENT)
Dept: PHYSICAL THERAPY | Age: 82
End: 2017-05-24
Payer: MEDICARE

## 2017-05-30 ENCOUNTER — APPOINTMENT (OUTPATIENT)
Dept: PHYSICAL THERAPY | Age: 82
End: 2017-05-30
Payer: MEDICARE

## 2017-06-02 ENCOUNTER — APPOINTMENT (OUTPATIENT)
Dept: PHYSICAL THERAPY | Age: 82
End: 2017-06-02
Payer: MEDICARE

## 2017-06-06 ENCOUNTER — HOSPITAL ENCOUNTER (OUTPATIENT)
Dept: PHYSICAL THERAPY | Age: 82
Discharge: HOME | End: 2017-06-06
Payer: MEDICARE

## 2017-06-06 PROCEDURE — 97112 NEUROMUSCULAR REEDUCATION: CPT

## 2017-06-06 PROCEDURE — 97110 THERAPEUTIC EXERCISES: CPT

## 2017-06-06 NOTE — PROGRESS NOTES
PT DAILY TREATMENT NOTE - [facility] 3-16    Patient Name: Briseida Kaur  Date:2017  : 1928  [x]  Patient  Verified  Payor: Anali Cash / Plan: [place] MED[place] & B / Product Type: Medicare /    In time:9:40  Out time:10:22  Total Treatment Time (min): 42  Total Timed Codes (min): 42  1:1 Treatment Time ([facility] only): 40   Visit #: 8 of 16    Treatment Area: Muscle weakness [M62.81]  Balance problem [R26.89]    SUBJECTIVE  Pain Level (0-10 scale): 0/10  Any medication changes, allergies to medications, adverse drug reactions, diagnosis change, or new procedure performed?: [x] No    [] Yes (see summary sheet for update)  Subjective functional status/changes:   [] No changes reported  \"I went on vacation for a week. I feel a lot better since I started     OBJECTIVE      12 min Therapeutic Exercise:  [x] See flow sheet :   Rationale: increase ROM, increase strength and improve coordination to improve the patients ability to perform ADLs with less pain. 30 min Neuromuscular Re-education:  [x]  See flow sheet :   Rationale: increase ROM, increase strength and improve coordination  to improve the patients ability to perform ADLs with less pain. With   [] TE   [] TA   [] neuro   [] other: Patient Education: [x] Review HEP    [] Progressed/Changed HEP based on:   [] positioning   [] body mechanics   [] transfers   [] heat/ice application    [] other:      Other Objective/Functional Measures: DIAMOND/56, see chart below for LE MMT     Pain Level (0-10 scale) post treatment: 0/10    ASSESSMENT/Changes in Function: Pt shows improvement with sitting and sit-stand transfers. Pt able to transfer without UE assist. Pt continues to require supervision for more advanced standing balance activities.     Patient will continue to benefit from skilled PT services to modify and progress therapeutic interventions, address ROM deficits, address strength deficits, analyze and address soft tissue restrictions and analyze and modify body mechanics/ergonomics to attain remaining goals. []  See Plan of Care  []  See progress note/recertification  []  See Discharge Summary         Progress towards goals / Updated goals:  Short Term Goals: STG- To be accomplished in 3 week(s):  1. Pt will be independent with HEP to encourage prophylaxis. Eval: held due to time and dizziness  Current: dispensed VOR, will update as pt progresses      2. Pt will demonstrate safe sit<>stand transfers, Mod I with adherence to all safety precautions. Eval: dizziness, use of hand on RW and increased dizziness. Current: sit<>stand form 19 in surface with no use of UE      Long Term Goals: LTG- To be accomplished in 8 week(s):  1. Pt will improve Romberg to 1-2 errors with EO and EC to increase tolerance to daily activities. Eval:unable to balance with regular POLLY  Current: Romberg EO: 0 errors,      2. Improve DIAMOND by >8 points to indicate decreased fall risk. Eval: 20/56 - increased fall risk  Current: reassess at visit 10 or 11      3. Improve right LE MMT to 3/5 MMT or greater to increase pt tolerance to daily activities and WB activities. Eval:  Strength Right Left Right Left   Hip Flex 3 4- 4+ 4   Knee Ext 4- 5     Hamstrings 4 4     DF 3 4 4+ 4   PF 3- (gross) 3 4 4+   IV 3 4 4 4   EV 3 4 4 4+                   Current: appropriately challenged with bridges      4. Pt FOTO score will increase by 10 points to show improvement in function. Eval:30  Current: having  connectivity issues, 5/17/17 will reassess next visit      5. Pt will be able to go from sitting<>standing without debilitating dizziness.   Eval: constant dizziness  Current: NA      PLAN  []  Upgrade activities as tolerated     []  Continue plan of care  []  Update interventions per flow sheet       []  Discharge due to:_  []  Other:_      Samantha Lucas 6/6/2017  10:14 AM

## 2017-06-08 ENCOUNTER — HOSPITAL ENCOUNTER (OUTPATIENT)
Dept: PHYSICAL THERAPY | Age: 82
Discharge: HOME | End: 2017-06-08
Payer: MEDICARE

## 2017-06-08 PROCEDURE — 97112 NEUROMUSCULAR REEDUCATION: CPT

## 2017-06-08 PROCEDURE — 97110 THERAPEUTIC EXERCISES: CPT

## 2017-06-08 NOTE — PROGRESS NOTES
PT DAILY TREATMENT NOTE - [facility] 3-16    Patient Name: Jake Richmond  Date:2017  : 1928  [x]  Patient  Verified  Payor: Chary Castellon / Plan: [place] MEDICARE PART A & B / Product Type: Medicare /    In time:1:00  Out time:1:36  Total Treatment Time (min): 36  Total Timed Codes (min): 36  1:1 Treatment Time ( [address] only): 36   Visit #: 9  16    Treatment Area: Muscle weakness [M62.81]  Balance problem [R26.89]    SUBJECTIVE  Pain Level (0-10 scale): 0/10  Any medication changes, allergies to medications, adverse drug reactions, diagnosis change, or new procedure performed?: [x] No    [] Yes (see summary sheet for update)  Subjective functional status/changes:   [] No changes reported  \"I feel fine. No pain and no dizziness. I go to the doctor tomorrow. \"    OBJECTIVE      16 min Therapeutic Exercise:  [x] See flow sheet :   Rationale: increase ROM, increase strength and improve coordination to improve the patients ability to amb safely throughout daily activities. 20 min Neuromuscular Re-education:  [x]  See flow sheet :   Rationale: increase ROM, increase strength and improve coordination  to improve the patients ability to amb safely throughout daily activities. With   [] TE   [] TA   [] neuro   [] other: Patient Education: [x] Review HEP    [] Progressed/Changed HEP based on:   [] positioning   [] body mechanics   [] transfers   [] heat/ice application    [] other:      Other Objective/Functional Measures: updated sit to stand goal     Pain Level (0-10 scale) post treatment: 0/10    ASSESSMENT/Changes in Function: Progressed pt balance program and added standing foam behind the back ball passes. Pt continues to require SBA for safety during balance. Pt able to perform sit-stand from 18' bed with no UE support without loss of balance.     Patient will continue to benefit from skilled PT services to modify and progress therapeutic interventions, address functional mobility deficits, address ROM deficits, analyze and address soft tissue restrictions, analyze and cue movement patterns and assess and modify postural abnormalities to attain remaining goals. []  See Plan of Care  []  See progress note/recertification  []  See Discharge Summary         Progress towards goals / Updated goals:  Short Term Goals: STG- To be accomplished in 3 week(s):  1. Pt will be independent with HEP to encourage prophylaxis. Eval: held due to time and dizziness  Current: dispensed VOR, will update as pt progresses      2. Pt will demonstrate safe sit<>stand transfers, Mod I with adherence to all safety precautions. Eval: dizziness, use of hand on RW and increased dizziness. Current: sit<>stand form 19 in surface with no use of UE      Long Term Goals: LTG- To be accomplished in 8 week(s):  1. Pt will improve Romberg to 1-2 errors with EO and EC to increase tolerance to daily activities. Eval:unable to balance with regular POLLY  Current: Romberg EO: 0 errors,      2. Improve DIAMOND by >8 points to indicate decreased fall risk. Eval: 20/56 - increased fall risk  Current: reassess at visit 10 or 11      3. Improve right LE MMT to 3/5 MMT or greater to increase pt tolerance to daily activities and WB activities. Eval:          Strength Right Left Right Left   Hip Flex 3 4- 4+ 4   Knee Ext 4- 5       Hamstrings 4 4       DF 3 4 4+ 4   PF 3- (gross) 3 4 4+   IV 3 4 4 4   EV 3 4 4 4+                      Current: appropriately challenged with bridges      4. Pt FOTO score will increase by 10 points to show improvement in function. Eval:30  Current:57      5. Pt will be able to go from sitting<>standing without debilitating dizziness.   Eval: constant dizziness  Current: Pt able to perform sit-stand from 18' bed with no UE support without loss of balance.           PLAN  []  Upgrade activities as tolerated     []  Continue plan of care  []  Update interventions per flow sheet       []  Discharge due to:_  []  Other:_ [facility] 6/8/2017  1:36 PM

## 2017-06-12 ENCOUNTER — HOSPITAL ENCOUNTER (OUTPATIENT)
Dept: PHYSICAL THERAPY | Age: 82
Discharge: HOME | End: 2017-06-12
Payer: MEDICARE

## 2017-06-12 PROCEDURE — G8978 MOBILITY CURRENT STATUS: HCPCS

## 2017-06-12 PROCEDURE — 97530 THERAPEUTIC ACTIVITIES: CPT

## 2017-06-12 PROCEDURE — G8979 MOBILITY GOAL STATUS: HCPCS

## 2017-06-12 PROCEDURE — 97112 NEUROMUSCULAR REEDUCATION: CPT

## 2017-06-12 NOTE — PROGRESS NOTES
[facility] at the [address], [place], [address] [facility]  Phone: [phone]      Fax:  [phone]        Medicare Progress Report    Patient name: Mickey Pierce Start of Care: 17   Referral source: Kenisha Belle MD : 1928   Medical/Treatment Diagnosis: Muscle weakness [M62.81]  Balance problem [R26.89] Onset Date:~17     Prior Hospitalization: see medical history Provider#: [id number]   Medications: Verified on Patient Summary List    Comorbidities: Allergies, BMI >30, HTN, Previous CVA  Prior Level of Function: constant dizziness, hx of 2 falls in last month, limited community Ambulator with rolling walker, CGA-SBA with stairs and 2 railings, CGA - Mod I with ADLs. Visits from Start of Care: 10    Missed Visits: 0    Reporting Period: 17 to 17    Progress Toward Goals:   Short Term Goals: STG- To be accomplished in 3 week(s):  1. Pt will be independent with HEP to encourage prophylaxis. Eval: held due to time and dizziness  Current: MET - dispensed VOR, will update as pt progresses      2. Pt will demonstrate safe sit<>stand transfers, Mod I with adherence to all safety precautions. Eval: dizziness, use of hand on RW and increased dizziness. Current:MET  sit<>stand form 18 in surface with no use of UE      Long Term Goals: LTG- To be accomplished in 8 week(s):  1. Pt will improve Romberg to 1-2 errors with EO and EC to increase tolerance to daily activities. Eval:unable to balance with regular POLLY  Current: Progressing: Romberg EO: 0 errors EC: 2-3 errors      2. Improve DIAMOND by >8 points to indicate decreased fall risk. Eval:  - increased fall risk  Current: MET 36/56      3. Improve right LE MMT to 3/5 MMT or greater to increase pt tolerance to daily activities and WB activities.   EvalElodia Irons            Strength Right Left Right  17 Left  17   Hip Flex 3 4- 4 4   Knee Ext 4- 5   4  5    Hamstrings 4 4   4   4   DF 3 4 4 4   PF 3- (gross) 3 4 4+   IV 3 4 4 4   EV 3 4 4 4+                           Current: appropriately challenged with bridges      4. Pt FOTO score will increase by 10 points to show improvement in function. Eval:30  Current:MET 57      5. Pt will be able to go from sitting<>standing without debilitating dizziness. Eval: constant dizziness  Current: MET - Pt able to perform sit-stand from 18' bed with no UE support without loss of balance. Key functional changes:   Pt has demonstrated excellent progress with PT. First 3 visits focussed on clearing BPPV. Treated with canal repositioning maneuver. Pt judy from almost constant dizziness to >90% improvement. Is now able to go from sit<>stand and roll in bed without difficulty. While continues to have decreased strength on Right side, has improved since start of treatment. Pt continues to be at elevated fall risk and requires RW for ambulation. Is most challenged on compliant surfaces and with EC. Problems/ barriers to goal attainment: none      Assessment / Recommendations:  Pt could benefit from continued skilled PT to address functional mobility, elevated fall risk, decreased balance and strength. Problem List: decrease ROM, decrease strength, edema affecting function, impaired gait/ balance, decrease ADL/ functional abilitiies, decrease activity tolerance, decrease flexibility/ joint mobility and decrease transfer abilities   Treatment Plan: Therapeutic exercise, Therapeutic activities, Neuromuscular re-education, Physical agent/modality, Gait/balance training, Manual therapy and Patient education    Patient Goal (s) has been updated and includes: \"To be like I was or better. \"     Updated Goals to be accomplished in 3 weeks:  Same as unmet above    1. Improve DIAMOND by >6 points to indicate decreased fall risk.   Eval: 20/56 - increased fall risk  Current: 36/56      Frequency / Duration: Patient to be seen 2 times per week for 3 weeks:    G-Codes (GP)  Mobility  [id number] Current  CK= 40-59%  [id number] Goal  CK= 40-59%    The severity rating is based on clinical judgement and the FOTO score.       Opal Figueredo, PT, DPT 6/12/2017 11:57 AM

## 2017-06-12 NOTE — PROGRESS NOTES
PT DAILY TREATMENT NOTE     Patient Name: Kalpesh Wright  Date:2017  : 1928  [x]  Patient  Verified  Payor: Randolph Crawford / Plan: [place] MEDICARE PART A & B / Product Type: Medicare /    In time:10:10 am  Out time:10:50 am  Total Treatment Time (min): 40  Total Timed Codes (min): 40  1:1 Treatment Time ( [address] only): 30  Visit #: 10 of 16    Treatment Area: Muscle weakness [M62.81]  Balance problem [R26.89]    SUBJECTIVE  Pain Level (0-10 scale): 0  Any medication changes, allergies to medications, adverse drug reactions, diagnosis change, or new procedure performed?: [x] No    [] Yes (see summary sheet for update)  Subjective functional status/changes:   [] No changes reported  \"I am good. He said    OBJECTIVE      10 min Therapeutic Exercise:  [x] See flow sheet :   Rationale: increase ROM, increase strength, improve coordination and increase proprioception to improve the patients ability to perform daily activities with decreased pain and symptom levels    20 min Therapeutic Activity:  [x]  See flow sheet :   Rationale: increase ROM, increase strength, improve coordination, improve balance and increase proprioception  to improve the patients ability to perform daily activities with decreased pain and symptom levels    10 min Neuromuscular Re-education:  [x]  See flow sheet :   Rationale: improve coordination, improve balance and increase proprioception  to improve the patients ability to perform daily activities with decreased pain and symptom levels            With   [] TE   [] TA   [] neuro   [] other: Patient Education: [x] Review HEP    [] Progressed/Changed HEP based on:   [] positioning   [] body mechanics   [] transfers   [] heat/ice application    [] other:      Other Objective/Functional Measures:   See goals for objective data     Pain Level (0-10 scale) post treatment: 0    ASSESSMENT/Changes in Function:   Pt has demonstrated excellent progress with PT.  First 3 visits focussed on clearing BPPV. Treated with canal repositioning maneuver. Pt judy from almost constant dizziness to >90% improvement. Is now able to go from sit<>stand and roll in bed without difficulty. While continues to have decreased strength on Right side, has improved since start of treatment. Pt continues to be at elevated fall risk and requires RW for ambulation. Is most challenged on compliant surfaces and with EC. Patient will continue to benefit from skilled PT services to modify and progress therapeutic interventions, address functional mobility deficits, address ROM deficits, address strength deficits, analyze and address soft tissue restrictions, analyze and cue movement patterns, analyze and modify body mechanics/ergonomics, assess and modify postural abnormalities, address imbalance/dizziness and instruct in home and community integration to attain remaining goals. []  See Plan of Care  []  See progress note/recertification  []  See Discharge Summary         Progress towards goals / Updated goals:  Short Term Goals: STG- To be accomplished in 3 week(s):  1. Pt will be independent with HEP to encourage prophylaxis. Eval: held due to time and dizziness  Current: MET - dispensed VOR, will update as pt progresses      2. Pt will demonstrate safe sit<>stand transfers, Mod I with adherence to all safety precautions. Eval: dizziness, use of hand on RW and increased dizziness. Current:MET  sit<>stand form 18 in surface with no use of UE      Long Term Goals: LTG- To be accomplished in 8 week(s):  1. Pt will improve Romberg to 1-2 errors with EO and EC to increase tolerance to daily activities. Eval:unable to balance with regular POLLY  Current: Progressing: Romberg EO: 0 errors EC: 2-3 errors      2. Improve DIAMOND by >8 points to indicate decreased fall risk. Eval: 20/56 - increased fall risk  Current: MET 36/56      3.  Improve right LE MMT to 3/5 MMT or greater to increase pt tolerance to daily activities and WB activities. Eval:                Strength Right Left Right  6/12/17 Left  6/12/17   Hip Flex 3 4- 4 4   Knee Ext 4- 5   4  5    Hamstrings 4 4   4   4   DF 3 4 4 4   PF 3- (gross) 3 4 4+   IV 3 4 4 4   EV 3 4 4 4+                         Current: appropriately challenged with bridges      4. Pt FOTO score will increase by 10 points to show improvement in function. Eval:30  Current:MET 57      5. Pt will be able to go from sitting<>standing without debilitating dizziness. Eval: constant dizziness  Current: MET - Pt able to perform sit-stand from 18' bed with no UE support without loss of balance.       PLAN  [x]  Upgrade activities as tolerated     []  Continue plan of care  []  Update interventions per flow sheet       []  Discharge due to:_  []  Other:_      Helena Ramos PT, DPT 6/12/2017  10:12 AM    Future Appointments  Date Time Provider [place]   6/13/2017 11:30 AM Mabeline Roge [facility]   6/20/2017 9:30 AM Helena Ramos PT, DPT [facility]   6/21/2017 10:30 AM Helena Ramos PT, DPT [facility]   6/26/2017 9:30 AM Mabeline Coolmariah [facility]   6/28/2017 10:00 AM Helena Ramos PT, DPT [facility]

## 2017-06-13 ENCOUNTER — HOSPITAL ENCOUNTER (OUTPATIENT)
Dept: PHYSICAL THERAPY | Age: 82
Discharge: HOME | End: 2017-06-13
Payer: MEDICARE

## 2017-06-13 PROCEDURE — 97112 NEUROMUSCULAR REEDUCATION: CPT

## 2017-06-13 NOTE — PROGRESS NOTES
PT DAILY TREATMENT NOTE - [facility] 3-16    Patient Name: Eric Block  Date:2017  : 1928  [x]  Patient  Verified  Payor: Jacinto Lynn / Plan: [place] MEDICARE PART A & B / Product Type: Medicare /    In time:11:30  Out time:12:15  Total Treatment Time (min): 45  Total Timed Codes (min): 45  1:1 Treatment Time ( [address] only): 20   Visit #: 11 of 16    Treatment Area: Muscle weakness [M62.81]  Balance problem [R26.89]    SUBJECTIVE  Pain Level (0-10 scale): 0/10  Any medication changes, allergies to medications, adverse drug reactions, diagnosis change, or new procedure performed?: [x] No    [] Yes (see summary sheet for update)  Subjective functional status/changes:   [] No changes reported  \"I'm not really dizzy at all. I feel great. I think I am all better but I will finish out the last couple of visit since it's recommended. \"    OBJECTIVE      25 min Therapeutic Exercise:  [x] See flow sheet :   Rationale: increase ROM, increase strength and improve coordination to improve the patients ability to perform ADLs with less pain. 20 min Neuromuscular Re-education:  [x]  See flow sheet :   Rationale: increase ROM, increase strength and improve coordination  to improve the patients ability to perform ADLs with less pain. With   [] TE   [] TA   [] neuro   [] other: Patient Education: [x] Review HEP    [] Progressed/Changed HEP based on:   [] positioning   [] body mechanics   [] transfers   [] heat/ice application    [] other:      Other Objective/Functional Measures:      Pain Level (0-10 scale) post treatment: 0/10    ASSESSMENT/Changes in Function: Pt showed very few errors with floor balance but continues to require UE corrections with EC. Pt denied modalities post tx.      Patient will continue to benefit from skilled PT services to modify and progress therapeutic interventions, address functional mobility deficits, address ROM deficits, analyze and address soft tissue restrictions, analyze and cue movement patterns, analyze and modify body mechanics/ergonomics and assess and modify postural abnormalities to attain remaining goals. []  See Plan of Care  []  See progress note/recertification  []  See Discharge Summary         Progress towards goals / Updated goals:  Short Term Goals: STG- To be accomplished in 3 week(s):  1. Pt will be independent with HEP to encourage prophylaxis. Eval: held due to time and dizziness  Status at recert/PN:  MET - dispensed VOR, will update as pt progresses  Current; MET    2. Pt will demonstrate safe sit<>stand transfers, Mod I with adherence to all safety precautions. Eval: dizziness, use of hand on RW and increased dizziness. Status at recert/PN: MET sit<>stand form 18 in surface with no use of UE  Current: MET      Long Term Goals: LTG- To be accomplished in 8 week(s):  1. Pt will improve Romberg to 1-2 errors with EO and EC to increase tolerance to daily activities. Eval:unable to balance with regular POLLY  Status at recert/PN:  Progressing: Romberg EO: 0 errors EC: 2-3 errors  Current: NT      2. Improve DIAMOND by >8 points to indicate decreased fall risk. Eval: 20/56 - increased fall risk  Status at recert/PN:  MET 78/83  Current: NT      3. Improve right LE MMT to 3/5 MMT or greater to increase pt tolerance to daily activities and WB activities. EvalPhilbert Vegas            Strength Right Left Right  6/12/17 Left  6/12/17   Hip Flex 3 4- 4 4   Knee Ext 4- 5   4  5    Hamstrings 4 4   4   4   DF 3 4 4 4   PF 3- (gross) 3 4 4+   IV 3 4 4 4   EV 3 4 4 4+                           Current: appropriately challenged with bridges      4. Pt FOTO score will increase by 10 points to show improvement in function. Eval:30  Current:MET 57      5. Pt will be able to go from sitting<>standing without debilitating dizziness.   Eval: constant dizziness  Current: MET - Pt able to perform sit-stand from 18' bed with no UE support without loss of balance.         PLAN  []  Upgrade activities as tolerated     [x]  Continue plan of care  []  Update interventions per flow sheet       []  Discharge due to:_  []  Other:_      Brandon Bashir 6/13/2017  3:43 PM

## 2017-06-15 ENCOUNTER — APPOINTMENT (OUTPATIENT)
Dept: PHYSICAL THERAPY | Age: 82
End: 2017-06-15
Payer: MEDICARE

## 2017-06-20 ENCOUNTER — HOSPITAL ENCOUNTER (OUTPATIENT)
Dept: PHYSICAL THERAPY | Age: 82
Discharge: HOME | End: 2017-06-20
Payer: MEDICARE

## 2017-06-20 PROCEDURE — G8979 MOBILITY GOAL STATUS: HCPCS

## 2017-06-20 PROCEDURE — 97112 NEUROMUSCULAR REEDUCATION: CPT

## 2017-06-20 PROCEDURE — 97530 THERAPEUTIC ACTIVITIES: CPT

## 2017-06-20 PROCEDURE — 97110 THERAPEUTIC EXERCISES: CPT

## 2017-06-20 PROCEDURE — G8980 MOBILITY D/C STATUS: HCPCS

## 2017-06-20 NOTE — PROGRESS NOTES
PT DISCHARGE DAILY NOTE AND [id number]    Date:2017  Patient name: Anna Lawrence Start of Care: 17   Referral source: Mari Salas MD : [id number]   Medical/Treatment Diagnosis: Muscle weakness [M62.81]  Balance problem [R26.89] Onset Date:~17     Prior Hospitalization: see medical history Provider#: [id number]   Medications: Verified on Patient Summary List    Comorbidities: Allergies, BMI >30, HTN, Previous CVA  Prior Level of Function: constant dizziness, hx of 2 falls in last month, limited community Ambulator with rolling walker, CGA-SBA with stairs and 2 railings, CGA - Mod I with ADLs. Visits from Start of Care: 12    Missed Visits: 0    Reporting Period : 17 to 17    [x]  Patient  Verified  Payor: Remy Chandler / Plan: [place] / Product Type: Medicare /    In time:9:30 am  Out time:10:20 am  Total Treatment Time (min): 50  Total Timed Codes (min): 50  1:1 Treatment Time (MC only): 40   Visit #: 12 of 16    SUBJECTIVE  Pain Level (0-10 scale): 0  Any medication changes, allergies to medications, adverse drug reactions, diagnosis change, or new procedure performed?: [x] No    [] Yes (see summary sheet for update)  Subjective functional status/changes:   [] No changes reported  \"I think I have to cancel next week. [place] am going to the farm. \"    OBJECTIVE    25 min Therapeutic Exercise: [x] See flow sheet :   Rationale: increase ROM, increase strength, improve coordination and increase proprioception to improve the patients ability to perform daily activities with decreased pain and symptom levels     15 min Therapeutic Activity: [x] See flow sheet : included forced weight bearing on right LE with sit<>stands   Rationale: increase ROM, increase strength, improve coordination, improve balance and increase proprioception to improve the patients ability to perform daily activities with decreased pain and symptom levels     10 min Neuromuscular Re-education: [x] See flow sheet :   Rationale: improve coordination, improve balance and increase proprioception to improve the patients ability to perform daily activities with decreased pain and symptom levels          With   [x] TE   [x] TA   [x] neuro   [] other: Patient Education: [x] Review HEP    [] Progressed/Changed HEP based on:   [] positioning   [] body mechanics   [] transfers   [] heat/ice application    [] other:      Other Objective/Functional Measures:   See goals for objective data     Pain Level (0-10 scale) post treatment: 0    Summary of Care:  Short Term Goals: STG- To be accomplished in 3 week(s):  1. Pt will be independent with HEP to encourage prophylaxis. Eval: held due to time and dizziness  Status at recert/PN:  MET - dispensed VOR, will update as pt progresses  Current; MET     2. Pt will demonstrate safe sit<>stand transfers, Mod I with adherence to all safety precautions. Eval: dizziness, use of hand on RW and increased dizziness. Status at recert/PN: MET sit<>stand form 18 in surface with no use of UE  Current: MET      Long Term Goals: LTG- To be accomplished in 8 week(s):  1. Pt will improve Romberg to 1-2 errors with EO and EC to increase tolerance to daily activities. Eval:unable to balance with regular POLLY  Status at recert/PN:  Progressing: Romberg EO: 0 errors EC: 2-3 errors  Current: NT      2. Improve DIAMOND by >8 points to indicate decreased fall risk. Eval: 20/56 - increased fall risk  Status at recert/PN:  MET 19/40      3. Improve right LE MMT to 3/5 MMT or greater to increase pt tolerance to daily activities and WB activities. EvalAbundio Drones            Strength Right Left Right  6/12/17 Left  6/12/17   Hip Flex 3 4- 4 4   Knee Ext 4- 5   4  5    Hamstrings 4 4   4   4   DF 3 4 4 4   PF 3- (gross) 3 4 4+   IV 3 4 4 4   EV 3 4 4 4+                           Current: Progressing,same as last PN       4.  Pt FOTO score will increase by 10 points to show improvement in function. Eval:30  Current:MET 57      5. Pt will be able to go from sitting<>standing without debilitating dizziness. Eval: constant dizziness  Current: MET - Pt able to perform sit-stand from 18' bed with no UE support without loss of balance. ASSESSMENT/Changes in Function:   Pt has demonstrated excellent progress with PT. First 3 visits focussed on clearing BPPV. Treated with canal repositioning maneuver. Pt judy from almost constant dizziness to >90% improvement. Is now able to go from sit<>stand and roll in bed without difficulty. While continues to have decreased strength on Right side, has improved since start of treatment. Pt continues to be at elevated fall risk and requires RW for ambulation. Is most challenged on compliant surfaces and with EC. Pt is going to be traveling for next 3 weeks, after discussion with pt and pt's daughter decided best to D/C at this time. If needs to resume PT in future will return to [place].    G-Codes (GP)  Mobility    Goal  CK= 40-59%  D/C  CK= 40-59%    The severity rating is based on clinical judgment and the FOTO score.       Thank you for this referral!     PLAN  [x]Discontinue therapy: [x]Patient has reached or is progressing toward set goals      []Patient is non-compliant or has abdicated      []Due to lack of appreciable progress towards set goals    Juma Aguirre, PT, DPT 6/20/2017  9:39 AM

## 2017-06-21 ENCOUNTER — APPOINTMENT (OUTPATIENT)
Dept: PHYSICAL THERAPY | Age: 82
End: 2017-06-21
Payer: MEDICARE

## 2017-06-26 ENCOUNTER — APPOINTMENT (OUTPATIENT)
Dept: PHYSICAL THERAPY | Age: 82
End: 2017-06-26
Payer: MEDICARE

## 2017-06-28 ENCOUNTER — APPOINTMENT (OUTPATIENT)
Dept: PHYSICAL THERAPY | Age: 82
End: 2017-06-28
Payer: MEDICARE